# Patient Record
Sex: MALE | Race: WHITE | Employment: PART TIME | URBAN - METROPOLITAN AREA
[De-identification: names, ages, dates, MRNs, and addresses within clinical notes are randomized per-mention and may not be internally consistent; named-entity substitution may affect disease eponyms.]

---

## 2024-05-31 ENCOUNTER — HOSPITAL ENCOUNTER (EMERGENCY)
Age: 74
Discharge: HOME | End: 2024-05-31
Payer: MEDICARE

## 2024-05-31 VITALS
OXYGEN SATURATION: 98 % | SYSTOLIC BLOOD PRESSURE: 147 MMHG | DIASTOLIC BLOOD PRESSURE: 79 MMHG | HEART RATE: 64 BPM | RESPIRATION RATE: 16 BRPM | TEMPERATURE: 98 F

## 2024-05-31 DIAGNOSIS — S01.01XA LACERATION OF SCALP, INITIAL ENCOUNTER: Primary | ICD-10-CM

## 2024-05-31 DIAGNOSIS — S00.03XA CONTUSION OF SCALP, INITIAL ENCOUNTER: ICD-10-CM
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
## 2024-05-31 PROCEDURE — 12001 RPR S/N/AX/GEN/TRNK 2.5CM/<: CPT

## 2024-05-31 PROCEDURE — 99282 EMERGENCY DEPT VISIT SF MDM: CPT

## 2024-05-31 ASSESSMENT — PAIN - FUNCTIONAL ASSESSMENT: PAIN_FUNCTIONAL_ASSESSMENT: NONE - DENIES PAIN

## 2024-05-31 NOTE — DISCHARGE INSTRUCTIONS
Staple removal in 7 days.  Head injury instructions provided.  Return to the ER for any worsening symptoms specifically for worsening headache, neck pain, vomiting, dizziness or change in mental status.

## 2024-05-31 NOTE — ED PROVIDER NOTES
[facility] ED  EMERGENCY DEPARTMENT ENCOUNTER        Pt Name: Kathleen Pérez  MRN: [id number]  Birthdate 1950  Date of evaluation: 5/31/2024  Provider: Roslyn Breen PA-C  PCP: Kirstie Castellanos MD  Note Started: 1:05 PM EDT 5/31/24      DENISA. I have evaluated this patient.        CHIEF COMPLAINT       Chief Complaint   Patient presents with    Head Injury     [place].  Hit head most likely on a rock.  No loc.  Lac to top of head       HISTORY OF PRESENT ILLNESS: 1 or more Elements     History From: Patient  Limitations to history : None    Kathleen Pérez is a 74 y.o. male who presents to the emergency department for evaluation after fall and sustaining scalp wound.  States he lost his footing near the [place] he slid down states it was a long slide and then hit his head at the end he thinks on a rock.  No LOC.  States he does not have a headache.  States his neck is a little sore states its muscle soreness.  No vomiting.  No dizziness.  Alert and oriented GCS 15.  Tetanus is up-to-date last was 2 years ago.  States he only came in because a nurse looked at his cut and told him it may need stitches and there was only a certain timeframe to get the stitches so that is why he came in states otherwise he feels fine.  Takes a baby aspirin a day.    Nursing Notes were all reviewed and agreed with or any disagreements were addressed in the HPI.    REVIEW OF SYSTEMS :      Review of Systems    Positives and Pertinent negatives as per HPI.     SURGICAL HISTORY     Past Surgical History:   Procedure Laterality Date    ACHILLES TENDON SURGERY Left     COLONOSCOPY  9/28/12    polyps    COLONOSCOPY  9/29/2015    Polyps    SKIN CANCER EXCISION      VENOUS CLOT SURGERY Left        CURRENTMEDICATIONS       Previous Medications    ASPIRIN 81 MG TABLET    Take 81 mg by mouth daily.      DIPHENHYDRAMINE (BENADRYL) 25 MG CAPSULE    Take 25 mg by mouth every 6 hours as needed for

## 2024-06-07 ENCOUNTER — HOSPITAL ENCOUNTER (EMERGENCY)
Age: 74
Discharge: HOME | End: 2024-06-07
Attending: EMERGENCY MEDICINE